# Patient Record
Sex: FEMALE | Race: WHITE
[De-identification: names, ages, dates, MRNs, and addresses within clinical notes are randomized per-mention and may not be internally consistent; named-entity substitution may affect disease eponyms.]

---

## 2018-10-31 ENCOUNTER — HOSPITAL ENCOUNTER (EMERGENCY)
Dept: HOSPITAL 25 - UCCORT | Age: 68
Discharge: HOME | End: 2018-10-31
Payer: MEDICARE

## 2018-10-31 VITALS — SYSTOLIC BLOOD PRESSURE: 159 MMHG | DIASTOLIC BLOOD PRESSURE: 81 MMHG

## 2018-10-31 DIAGNOSIS — J98.01: ICD-10-CM

## 2018-10-31 DIAGNOSIS — E11.9: ICD-10-CM

## 2018-10-31 DIAGNOSIS — Z79.84: ICD-10-CM

## 2018-10-31 DIAGNOSIS — Z87.01: ICD-10-CM

## 2018-10-31 DIAGNOSIS — I10: ICD-10-CM

## 2018-10-31 DIAGNOSIS — Z88.1: ICD-10-CM

## 2018-10-31 DIAGNOSIS — J18.9: Primary | ICD-10-CM

## 2018-10-31 PROCEDURE — G0463 HOSPITAL OUTPT CLINIC VISIT: HCPCS

## 2018-10-31 PROCEDURE — 71046 X-RAY EXAM CHEST 2 VIEWS: CPT

## 2018-10-31 PROCEDURE — 99213 OFFICE O/P EST LOW 20 MIN: CPT

## 2018-10-31 PROCEDURE — 93005 ELECTROCARDIOGRAM TRACING: CPT

## 2018-10-31 NOTE — UC
Respiratory Complaint HPI





- HPI Summary


HPI Summary: 





The patient is a 67 yo female with the onset of cough about 2 weeks 

ago.Shortness of breath with exertion  started this AM


No f/c


No CP but has felt chest pressure


She hears herself wheezing





She has had pneumonia and bronchitis in the past





no n/v/d


no diaphoresis





- History of Current Complaint


Chief Complaint: UCRespiratory


Stated Complaint: COUGH,DIFFICULTY BREATHING


Time Seen by Provider: 10/31/18 18:55


Hx Obtained From: Patient


Onset/Duration: Gradual Onset, Lasting Hours


Timing: Constant


Severity Initially: Mild


Severity Currently: Moderate


Pain Intensity: 0


Pain Scale Used: 0-10 Numeric


Character: Cough: Nonproductive


Aggravating Factors: Exertion


Alleviating Factors: Nothing


Associated Signs And Symptoms: Positive: Dyspnea - primarily with exertion, 

Wheezing.  Negative: Fever, Chills, Pleuritic Chest Pain, Hemoptysis, Dizziness

, Calf Pain, Calf Swelling, Edema, URI, Nasal Congestion, Hoarseness, Sinus 

Discomfort





- Allergies/Home Medications


Allergies/Adverse Reactions: 


 Allergies











Allergy/AdvReac Type Severity Reaction Status Date / Time


 


cephalexin [From Keflex] Allergy Mild Rash Verified 10/31/18 19:08











Home Medications: 


 Home Medications





HYDROcodone/ACETAMIN 5-325 MG* [Norco 5-325 TAB*] 1 tab BID PRN 10/31/18 [

History Confirmed 10/31/18]


Linagliptin (NF) [Tradjenta (NF)] 1 tab DAILY 10/31/18 [History Confirmed 10/31/

18]


Naproxen Sodium [Aleve] 2 cap BID PRN 10/31/18 [History Confirmed 10/31/18]


metFORMIN* [Glucophage 500 MG TAB *] 500 - 1,000 mg TID 10/31/18 [History 

Confirmed 10/31/18]











PMH/Surg Hx/FS Hx/Imm Hx


Previously Healthy: Yes


Endocrine History: Diabetes, Dyslipidemia


Cardiovascular History: Hypertension


Respiratory History: Bronchitis, Pneumonia


GI/ History: Diverticulitis





- Surgical History


Surgical History: Yes


Surgery Procedure, Year, and Place: colon resection for diverticulitis.  hernia 

surgery





- Family History


Known Family History: Positive: Hypertension





- Social History


Alcohol Use: None


Substance Use Type: None


Smoking Status (MU): Never Smoked Tobacco





Review of Systems


Constitutional: Fatigue


Skin: Negative


Eyes: Negative


ENT: Negative


Respiratory: Shortness Of Breath, Cough


Cardiovascular: Negative


Gastrointestinal: Negative


Genitourinary: Negative


Motor: Negative


Neurovascular: Negative


Musculoskeletal: Negative


Neurological: Negative


Psychological: Negative


All Other Systems Reviewed And Are Negative: Yes





Physical Exam


Triage Information Reviewed: Yes


Appearance: Well-Appearing, No Pain Distress, Well-Nourished


Vital Signs: 


 Initial Vital Signs











Temp  97.2 F   10/31/18 19:12


 


Pulse  94   10/31/18 19:12


 


Resp  18   10/31/18 19:12


 


BP  159/81   10/31/18 19:12


 


Pulse Ox  97   10/31/18 19:12











Vital Signs Reviewed: Yes


Eyes: Positive: Conjunctiva Clear


ENT: Positive: Hearing grossly normal, Uvula midline.  Negative: Nasal 

congestion, Nasal drainage, Muffled voice, Hoarse voice


Neck: Positive: Supple, Nontender, No Lymphadenopathy


Respiratory: Positive: No respiratory distress, No accessory muscle use, 

Wheezing


Cardiovascular: Positive: RRR


Musculoskeletal: Positive: ROM Intact, No Edema


Neurological: Positive: Alert


Psychological Exam: Normal


Skin Exam: Normal





UC Diagnostic Evaluation





- Laboratory


O2 Sat by Pulse Oximetry: 97 - normal/not hypoxic





- Radiology


Radiology Interpretation Completed By: ED Physician


Summary of Radiographic Findings: SUSPECT RML INFILTRATE





- EKG


Cardiac Rate: NL


Cardiac Rhythm: Sinus: Normal


Ectopy: None


ST Segment: Normal





Re-Evaluation





- Re-Evaluation


  ** First Eval


Re-Evaluation Time: 19:50


Change: Improved - subjectively much better/better air movement/minimal wheezing





Respiratory Course/Dx





- Differential Dx/Diagnosis


Provider Diagnoses: PNEUMONIA.  BRONCHOSPASM





Discharge





- Sign-Out/Discharge


Documenting (check all that apply): Patient Departure


All imaging exams completed and their final reports reviewed: No





- Discharge Plan


Condition: Improved


Disposition: HOME


Prescriptions: 


Amoxicillin/Clavulanate TAB* [Augmentin *] 875 mg PO BID #14 tab


Azithromycin TAB* [Zithromax TAB*] 250 mg PO DAILY #4 tab


Patient Education Materials:  Bronchospasm (ED), Pneumonia (ED)


Additional Instructions: 


I suspect a pneumonia





the offical XR reading is pending





To ER for worsening symptoms or if shortness of breath recurs





Use inhaler as directed ( 2 puffs 4x day for 7 days)





recheck in 2-3 days if not markedly improved











- Billing Disposition and Condition


Condition: IMPROVED


Disposition: Home

## 2018-11-01 NOTE — ED
Progress





- Progress Note


Progress Note: 





Xray final reviewed.  No acute process identified by radiology read.





Re-Evaluation





- Re-Evaluation


  ** First Eval


Re-Evaluation Time: 19:50


Change: Improved - subjectively much better/better air movement/minimal wheezing





Discharge





- Sign-Out/Discharge


Documenting (check all that apply): Patient Departure


All imaging exams completed and their final reports reviewed: Yes





- Discharge Plan


Condition: Improved


Disposition: HOME


Prescriptions: 


Amoxicillin/Clavulanate TAB* [Augmentin *] 875 mg PO BID #14 tab


Azithromycin TAB* [Zithromax TAB*] 250 mg PO DAILY #4 tab


Patient Education Materials:  Bronchospasm (ED), Pneumonia (ED)


Forms:  *Work Release


Referrals: 


Adelita Lentz MD [Primary Care Provider] - 


Additional Instructions: 


I suspect a pneumonia





the offical XR reading is pending





To ER for worsening symptoms or if shortness of breath recurs





Use inhaler as directed ( 2 puffs 4x day for 7 days)





recheck in 2-3 days if not markedly improved











- Billing Disposition and Condition


Condition: IMPROVED


Disposition: Home

## 2018-11-01 NOTE — RAD
INDICATION: Nonproductive cough for 2 weeks. Afebrile.



COMPARISON: No relevant prior exams available on the [facility] [place], [place] PACS for comparison.



TECHNIQUE:  Dual energy PA and routine lateral views of the chest were obtained.



REPORT: Elevated lung volumes and both diffuse mild prominence of the interstitial

markings and patchy rarefaction of the mid to upper lung zone interstitial markings. 

Accessory azygos lobe at the medial RIGHT upper lung zone. No compelling alveolar

consolidation. Medial RIGHT lower lung zone opacity reflects RIGHT convex curve of the

thoracic spine resulting displacement of the mediastinum. No suspicious focal pulmonary

lesion, pleural effusion, or pneumothorax. Negative for cardiomegaly. Unremarkable central

pulmonary vasculature. 

IMPRESSION: 

#. Stigmata of obstructive lung disease. No acute pulmonary or cardiac process evident.



R2